# Patient Record
Sex: FEMALE | Race: WHITE | Employment: FULL TIME | URBAN - METROPOLITAN AREA
[De-identification: names, ages, dates, MRNs, and addresses within clinical notes are randomized per-mention and may not be internally consistent; named-entity substitution may affect disease eponyms.]

---

## 2019-01-01 ENCOUNTER — HOSPITAL ENCOUNTER (EMERGENCY)
Age: 59
End: 2019-01-07
Attending: EMERGENCY MEDICINE | Admitting: INTERNAL MEDICINE
Payer: SELF-PAY

## 2019-01-01 VITALS
TEMPERATURE: 96.1 F | WEIGHT: 184.53 LBS | DIASTOLIC BLOOD PRESSURE: 53 MMHG | BODY MASS INDEX: 30.71 KG/M2 | SYSTOLIC BLOOD PRESSURE: 100 MMHG | HEART RATE: 98 BPM | OXYGEN SATURATION: 100 % | RESPIRATION RATE: 15 BRPM

## 2019-01-01 DIAGNOSIS — I46.9 CARDIAC ARREST (HCC): Primary | ICD-10-CM

## 2019-01-01 DIAGNOSIS — I21.09 ST ELEVATION MYOCARDIAL INFARCTION (STEMI) OF ANTERIOR WALL (HCC): ICD-10-CM

## 2019-01-01 LAB
ARTERIAL PATENCY WRIST A: YES
BASE DEFICIT BLDA-SCNC: 21.6 MMOL/L
BDY SITE: ABNORMAL
EPAP/CPAP/PEEP, PAPEEP: 6
FIO2 ON VENT: 100 %
GAS FLOW.O2 SETTING OXYMISER: 14 L/MIN
HCO3 BLDA-SCNC: 11 MMOL/L (ref 22–26)
PCO2 BLDA: 52 MMHG (ref 35–45)
PH BLDA: 6.94 [PH] (ref 7.35–7.45)
PO2 BLDA: 171 MMHG (ref 80–100)
SAO2 % BLD: 98 % (ref 92–97)
SAO2% DEVICE SAO2% SENSOR NAME: ABNORMAL
SERVICE CMNT-IMP: ABNORMAL
SPECIMEN SITE: ABNORMAL
VENTILATION MODE VENT: ABNORMAL
VT SETTING VENT: 450 ML

## 2019-01-01 PROCEDURE — 93005 ELECTROCARDIOGRAM TRACING: CPT

## 2019-01-01 PROCEDURE — 96376 TX/PRO/DX INJ SAME DRUG ADON: CPT

## 2019-01-01 PROCEDURE — 96368 THER/DIAG CONCURRENT INF: CPT

## 2019-01-01 PROCEDURE — 77030008683 HC TU ET CUF COVD -A

## 2019-01-01 PROCEDURE — 77030021678 HC GLIDESCP STAT DISP VERT -B

## 2019-01-01 PROCEDURE — 36600 WITHDRAWAL OF ARTERIAL BLOOD: CPT

## 2019-01-01 PROCEDURE — 94002 VENT MGMT INPAT INIT DAY: CPT

## 2019-01-01 PROCEDURE — 92950 HEART/LUNG RESUSCITATION CPR: CPT

## 2019-01-01 PROCEDURE — 74011250636 HC RX REV CODE- 250/636

## 2019-01-01 PROCEDURE — 77030013079 HC BLNKT BAIR HGGR 3M -A

## 2019-01-01 PROCEDURE — 74011000250 HC RX REV CODE- 250

## 2019-01-01 PROCEDURE — 96365 THER/PROPH/DIAG IV INF INIT: CPT

## 2019-01-01 PROCEDURE — 74011250636 HC RX REV CODE- 250/636: Performed by: INTERNAL MEDICINE

## 2019-01-01 PROCEDURE — 82803 BLOOD GASES ANY COMBINATION: CPT

## 2019-01-01 PROCEDURE — 74011250636 HC RX REV CODE- 250/636: Performed by: EMERGENCY MEDICINE

## 2019-01-01 PROCEDURE — 99285 EMERGENCY DEPT VISIT HI MDM: CPT

## 2019-01-01 PROCEDURE — 74011000250 HC RX REV CODE- 250: Performed by: EMERGENCY MEDICINE

## 2019-01-01 RX ORDER — FENTANYL CITRATE 50 UG/ML
INJECTION, SOLUTION INTRAMUSCULAR; INTRAVENOUS
Status: DISCONTINUED
Start: 2019-01-01 | End: 2019-01-01 | Stop reason: WASHOUT

## 2019-01-01 RX ORDER — FENTANYL CITRATE 50 UG/ML
25-50 INJECTION, SOLUTION INTRAMUSCULAR; INTRAVENOUS
Status: CANCELLED | OUTPATIENT
Start: 2019-01-01

## 2019-01-01 RX ORDER — BIVALIRUDIN 250 MG/5ML
INJECTION, POWDER, LYOPHILIZED, FOR SOLUTION INTRAVENOUS
Status: DISCONTINUED
Start: 2019-01-01 | End: 2019-01-01 | Stop reason: WASHOUT

## 2019-01-01 RX ORDER — NOREPINEPHRINE BITARTRATE/D5W 4MG/250ML
PLASTIC BAG, INJECTION (ML) INTRAVENOUS
Status: COMPLETED | OUTPATIENT
Start: 2019-01-01 | End: 2019-01-01

## 2019-01-01 RX ORDER — LIDOCAINE HYDROCHLORIDE 10 MG/ML
1-30 INJECTION, SOLUTION EPIDURAL; INFILTRATION; INTRACAUDAL; PERINEURAL
Status: CANCELLED | OUTPATIENT
Start: 2019-01-01

## 2019-01-01 RX ORDER — HEPARIN SODIUM 200 [USP'U]/100ML
500 INJECTION, SOLUTION INTRAVENOUS ONCE
Status: CANCELLED | OUTPATIENT
Start: 2019-01-01 | End: 2019-01-01

## 2019-01-01 RX ORDER — AMIODARONE HYDROCHLORIDE 150 MG/3ML
INJECTION, SOLUTION INTRAVENOUS
Status: COMPLETED | OUTPATIENT
Start: 2019-01-01 | End: 2019-01-01

## 2019-01-01 RX ORDER — SODIUM BICARBONATE 1 MEQ/ML
SYRINGE (ML) INTRAVENOUS
Status: COMPLETED | OUTPATIENT
Start: 2019-01-01 | End: 2019-01-01

## 2019-01-01 RX ORDER — SODIUM CHLORIDE 900 MG/100ML
INJECTION INTRAVENOUS
Status: DISCONTINUED
Start: 2019-01-01 | End: 2019-01-01 | Stop reason: WASHOUT

## 2019-01-01 RX ORDER — MIDAZOLAM HYDROCHLORIDE 1 MG/ML
INJECTION, SOLUTION INTRAMUSCULAR; INTRAVENOUS
Status: DISCONTINUED
Start: 2019-01-01 | End: 2019-01-01 | Stop reason: WASHOUT

## 2019-01-01 RX ORDER — EPINEPHRINE 0.1 MG/ML
INJECTION INTRACARDIAC; INTRAVENOUS
Status: COMPLETED | OUTPATIENT
Start: 2019-01-01 | End: 2019-01-01

## 2019-01-01 RX ORDER — HEPARIN SODIUM 1000 [USP'U]/ML
INJECTION, SOLUTION INTRAVENOUS; SUBCUTANEOUS
Status: DISCONTINUED
Start: 2019-01-01 | End: 2019-01-01 | Stop reason: WASHOUT

## 2019-01-01 RX ORDER — HEPARIN SODIUM 5000 [USP'U]/ML
4000 INJECTION, SOLUTION INTRAVENOUS; SUBCUTANEOUS
Status: COMPLETED | OUTPATIENT
Start: 2019-01-01 | End: 2019-01-01

## 2019-01-01 RX ORDER — HEPARIN SODIUM 10000 [USP'U]/100ML
10-25 INJECTION, SOLUTION INTRAVENOUS
Status: DISCONTINUED | OUTPATIENT
Start: 2019-01-01 | End: 2019-01-01 | Stop reason: HOSPADM

## 2019-01-01 RX ORDER — LIDOCAINE HYDROCHLORIDE 10 MG/ML
INJECTION, SOLUTION EPIDURAL; INFILTRATION; INTRACAUDAL; PERINEURAL
Status: DISCONTINUED
Start: 2019-01-01 | End: 2019-01-01 | Stop reason: HOSPADM

## 2019-01-01 RX ORDER — HEPARIN SODIUM 200 [USP'U]/100ML
INJECTION, SOLUTION INTRAVENOUS
Status: DISCONTINUED
Start: 2019-01-01 | End: 2019-01-01 | Stop reason: HOSPADM

## 2019-01-01 RX ADMIN — EPINEPHRINE 1 MG: 0.1 INJECTION, SOLUTION ENDOTRACHEAL; INTRACARDIAC; INTRAVENOUS at 13:10

## 2019-01-01 RX ADMIN — EPINEPHRINE 1 MG: 0.1 INJECTION, SOLUTION ENDOTRACHEAL; INTRACARDIAC; INTRAVENOUS at 12:58

## 2019-01-01 RX ADMIN — Medication 20 MCG/MIN: at 13:25

## 2019-01-01 RX ADMIN — EPINEPHRINE 1 MG: 0.1 INJECTION, SOLUTION ENDOTRACHEAL; INTRACARDIAC; INTRAVENOUS at 13:16

## 2019-01-01 RX ADMIN — HEPARIN SODIUM AND DEXTROSE 10 UNITS/KG/HR: 10000; 5 INJECTION INTRAVENOUS at 14:16

## 2019-01-01 RX ADMIN — SODIUM CHLORIDE 1000 ML: 900 INJECTION, SOLUTION INTRAVENOUS at 13:10

## 2019-01-01 RX ADMIN — EPINEPHRINE 1 MG: 0.1 INJECTION, SOLUTION ENDOTRACHEAL; INTRACARDIAC; INTRAVENOUS at 14:43

## 2019-01-01 RX ADMIN — HEPARIN SODIUM 4000 UNITS: 5000 INJECTION INTRAVENOUS; SUBCUTANEOUS at 13:04

## 2019-01-01 RX ADMIN — AMIODARONE HYDROCHLORIDE 150 MG: 50 INJECTION, SOLUTION INTRAVENOUS at 13:03

## 2019-01-01 RX ADMIN — SODIUM BICARBONATE 50 MEQ: 84 INJECTION, SOLUTION INTRAVENOUS at 13:15

## 2019-01-07 PROBLEM — I46.9 CARDIAC ARREST (HCC): Status: ACTIVE | Noted: 2019-01-01

## 2019-01-07 PROBLEM — I21.3 STEMI (ST ELEVATION MYOCARDIAL INFARCTION) (HCC): Status: ACTIVE | Noted: 2019-01-01

## 2019-01-07 NOTE — PROGRESS NOTES
Pharmacy  Heparin Monitoring Indication: AMI Goal aPTT: 58-77 seconds Initial dosing Weight: 83.7 kg Labs: 
No results for input(s): APTT, HGB, PLT, INR, HGBEXT, PLTEXT in the last 72 hours. No lab exists for component: INREXT Current rate:  10 unit/kg/hr Impression/Plan:  
? Obtain baseline CBC, aPTT before starting heparin ? Start at 10 unit/kg/hour and please don't give a bolus based on the aPTT 6 hours from the start of the heparin drip, since the patient received tPA for MI. 
? Can give a bolus of heparin 12 hours from the start of the heparin drip using the protocol. ? PRN bolus dose (Yes  Include Dose or No): Yes, already received 4,000 unitsX1 ? Infusion rate, PRN bolus dose and aPTT results were discussed with the nurse: (Yes/No): Not with the nurse, but with the ED pharmacist  
 
Thanks, ESEQUIEL LoaizaD 
 
[email or web address]/[email or web address]. pdf

## 2019-01-07 NOTE — ED PROVIDER NOTES
EMERGENCY DEPARTMENT HISTORY AND PHYSICAL EXAM 
 
 
Date: 1/7/2019 Patient Name: Braulio Albrecht History of Presenting Illness No chief complaint on file. History Provided By: EMS 
 
HPI: Braulio Albrecht, 59 y.o. female with PMHx significant for HTN, hypercholesterolemia, presents via EMS intubated with CPR in progress to the ED for further evaluation of being found unresponsive ~1210 this afternoon. EMS reports pt on 1/5 pt called EMS for chest pain / SOB likely to be having a panic attack. Upon EMS arrival a 12 lead was taken and shown to be normal and she was not transported to the hospital. EMS discloses pt woke up around 0200 this morning with similar sx and called EMS. Another 12 lead was taken showing NSR and pt was not transported to the ED. Pt obtained appointment with PCP this morning and her sx were attributed to panic attacks and pt was referred to a cardiologist. Pt left PCP's office around 11:30 AM and was found by a bystander unresponsive, apneic, and in asystole, noting bystander CPR was started as EMS was en route. EMS arrived on the scene at 29  and pt was intubated and has been in PEA en route to the ED. Pt has received 5 rounds of epinephrine en route. History is limited due to pt being unresponsive. There are no other complaints, changes, or physical findings at this time. PCP: No primary care provider on file. No current facility-administered medications on file prior to encounter. Current Outpatient Medications on File Prior to Encounter Medication Sig Dispense Refill  
 HYDROcodone-acetaminophen (NORCO)  mg tablet Take 1 Tab by mouth every six (6) hours as needed for Pain. Max Daily Amount: 4 Tabs. 25 Tab 0  
 venlafaxine (EFFEXOR) 25 mg tablet Take 25 mg by mouth two (2) times a day.  lisinopril (PRINIVIL, ZESTRIL) 10 mg tablet Take  by mouth daily.  pravastatin (PRAVACHOL) 10 mg tablet Take  by mouth nightly. Past History Past Medical History: 
Past Medical History:  
Diagnosis Date  Aortic insufficiency  Hypercholesterolemia  Hypertension  Murmur, cardiac Past Surgical History: 
Past Surgical History:  
Procedure Laterality Date  HX GYN    
 tubal ligation Family History: 
Family History Problem Relation Age of Onset  Heart Disease Mother  Heart Disease Father  Heart Disease Brother  Anesth Problems Neg Hx Social History: 
Social History Tobacco Use  Smoking status: Former Smoker  Smokeless tobacco: Never Used Substance Use Topics  Alcohol use: Yes Comment: social  
 Drug use: No  
 
 
Allergies: 
No Known Allergies Review of Systems Review of Systems Unable to perform ROS: Patient unresponsive Physical Exam  
Physical Exam  
Constitutional: She is intubated. Perez airway No obvious signs of trauma HENT:  
Head: Normocephalic and atraumatic. Eyes: Conjunctivae are normal.  
Pupils are significantly dilated and fixed Neck: Normal range of motion. Neck supple. Cardiovascular:  
Pulses: 
     Carotid pulses are 0 on the right side, and 0 on the left side. Radial pulses are 0 on the right side, and 0 on the left side. Femoral pulses are 0 on the right side, and 0 on the left side. Pulmonary/Chest: She is intubated. Bagging without difficulty Abdominal: Soft. She exhibits no distension. There is no tenderness. Musculoskeletal: She exhibits no edema or deformity. Neurological: She is unresponsive. Skin: There is pallor. Nursing note and vitals reviewed. Diagnostic Study Results Labs - Recent Results (from the past 12 hour(s)) EKG, 12 LEAD, INITIAL Collection Time: 01/07/19  1:26 PM  
Result Value Ref Range Ventricular Rate 67 BPM  
 Atrial Rate 56 BPM  
 QRS Duration 142 ms  
 Q-T Interval 470 ms QTC Calculation (Bezet) 496 ms Calculated R Axis 21 degrees Calculated T Axis 59 degrees Diagnosis Wide QRS rhythm Right bundle branch block When compared with ECG of 27-MAR-2015 07:01, Wide QRS rhythm has replaced Sinus rhythm BLOOD GAS, ARTERIAL Collection Time: 01/07/19  1:45 PM  
Result Value Ref Range  
 pH 6.94 (LL) 7.35 - 7.45    
 PCO2 52 (H) 35.0 - 45.0 mmHg PO2 171 (H) 80 - 100 mmHg O2 SAT 98 (H) 92 - 97 % BICARBONATE 11 (L) 22 - 26 mmol/L  
 BASE DEFICIT 21.6 mmol/L  
 O2 METHOD VENTILATOR    
 FIO2 100 % MODE A/C Tidal volume 450 SET RATE 14    
 EPAP/CPAP/PEEP 6.0 Sample source ARTERIAL    
 SITE LEFT RADIAL OLGA'S TEST YES Critical value read back Viv Erick MCGHEE   
 
 
Radiologic Studies - No orders to display CT Results  (Last 48 hours) None CXR Results  (Last 48 hours) None Medical Decision Making I am the first provider for this patient. I reviewed the vital signs, available nursing notes, past medical history, past surgical history, family history and social history. Vital Signs-Reviewed the patient's vital signs. Patient Vitals for the past 12 hrs: 
 Temp Pulse Resp BP SpO2  
01/07/19 1420  98  100/53   
01/07/19 1405  96  (!) 53/30   
01/07/19 1401  (!) 108  (!) 140/99 100 % 01/07/19 1356  83 15    
01/07/19 1350  75 20 (!) 142/94   
01/07/19 1343 96.1 °F (35.6 °C) 82 15 (!) 143/92   
01/07/19 1332  71 10 102/80   
01/07/19 1327  68  (!) 84/69   
01/07/19 1325  73  (!) 84/72   
01/07/19 1321    (!) 95/36   
01/07/19 1320  61 17 103/42   
01/07/19 1312  (!) 116 (!) 44 103/42   
01/07/19 1306  87 (!) 46 (!) 79/66   
01/07/19 1300    (!) 141/96   
01/07/19 1258  87 19   EKG interpretation: (Preliminary) 1259 Rhythm: sinus tachycardia with 1st degree AV block; and regular . Rate (approx.): 105;  Axis: normal; ID interval: normal; QRS interval: normal ; ST/T wave: elevation V1-V4 with reciprocated ST depression; Other findings: STEMI. EKG interpretation: (Repeat) 1302 Rhythm: Wide complex rhythm with POLINA; and regular . Rate (approx.): 91; Axis: left axis deviation; FL interval: normal; QRS interval: normal ; ST/T wave: elevated ; Other findings: possible ischemia. Records Reviewed: Nursing Notes, Old Medical Records, Previous electrocardiograms, Ambulance Run Sheet, Previous Radiology Studies and Previous Laboratory Studies Provider Notes (Medical Decision Making):  
Patient presents pulseless in cardiac arrest.  CPR immediately started and IV attempted for medication administration, fluids and labs. DDx: ACS, cardiogenic shock, cardiac Tamponade, Septic shock, tension PTX, PE, Hyperkalemia, hypokalemia, hypoglycemia, acidosis, hypovolemia, hypothermia, hypoxic respiratory failure, drug/toxin toxicity. ED Course:  
Initial assessment performed. The patients presenting problems have been discussed, and they are in agreement with the care plan formulated and outlined with them. I have encouraged them to ask questions as they arise throughout their visit. ED Course as of Jan 07 1635 Ehsanjorge Yuli Jan 07, 2019  
1605 Called pt son at [phone] and number is stating busy. Will attempt again. Eric Zamora M.D. 
  [JS] ED Course User Index [JS] Jeremiah Macedo MD  
  
Progress Notes: 
 
Pt found in asystole and apneic around 1210 when bystander CPR was started. CPR in progress at time of arrival to unit. Code Blue:  
1919- arrival to ED / unit 1256- Bicarb (1) / D50 pushed 1257 - pulse check, carotid pulse found, bedside US shows cardiac activity, CPR held. 1258- Epinephrine (6)  
1259 - EKG obtained showing STEMI, cardiology paged 1305 - pulse lost, CPR resumed, Eric Zamora M.D has spoken with Dr. Destin Howe who will evaluate the pt in the ED 
1309 - pulse check, no pulse found however pt in junctional bradycardic rhythm; bedside ultrasound still shows cardiac activity; CPR resumed 1310 - epinephrine (7)  
1312- cardiology at bedside 1313- pulse check, no pulse found 1314 - CPR resumed 1315 - Bicarb (2) / Epinephrine (8) 
1316 - pulse check, CPR resumed 1318 - pulse check, pulse found; bedside ultrasound shows cardiac activity; CPR held 1320 - 100mg TPA pushed Code Blue 1434 - PEA seen on monitor, CPR started 1434 - Epinephrine (1) 
1437 - Epinephrine (2) 
1438 - Pulse check, no pulse found 1439 - CPR resumed 1440 - Bicarb (1) 
1441 Epinephrine (3) 
1441 - Pulse check, no pulse found; CPR resumed 1443 - pulse check, no pulse found; Bedside ultrasound shows no cardiac activity 1443 - TOD called [address] [place] [address] 
1958 Time EMS pre-alert: N/A Time STEMI called: 7001 Time arrived on Unit: 1252, Marline Fothergill, MD in room Time EKG completed: 1259 Time EKG read by provider: [phone] Time cardiology/cath lab paged: [phone] Time cardiologist returned call: [phone] Time Cath Lab returned call:  N/A Time Cardiologist arrived on Unit: [id number] Time Cath Lab arrived on Unit: 4707 Procedure Note - Bedside Ultrasound: 
12:57 PM 
Performed by: Madiha Aranda M.D 
US of heart performed at beside, showing good cardiac activity. The procedure took 1-15 minutes, and pt tolerated well. Procedure Note - Bedside Ultrasound: 
1:09 PM 
Performed by: Madiha Aranda M.D 
US of heart performed at beside, showing cardiac activity, junctional bradycardic rhythm seen on monitor. The procedure took 1-15 minutes, and pt tolerated well. Procedure Note - Bedside Ultrasound: 
1:18 PM 
Performed by: Madiha Aranda M.D 
US of heart performed at beside, showing cardiac activity. The procedure took 1-15 minutes, and pt tolerated well. Procedure Note - Bedside Ultrasound: 
2:43 PM 
Performed by: Madiha Aranda M.D 
US of heart performed at beside, showing no cardiac activity. The procedure took 1-15 minutes, and pt tolerated well. 
 
3:07 PM  
Marzena Anderson M.D has attempted to call pt's family. No voicemail box set up.  
 
3:41 PM  
Marzena Anderson M.D has attempted to call pt's  Dior Peralta again, unable to leave voicemail. 4:36 PM 
Called  again and goes to . The phone number given to me for pt son is no longer in service. PD went to the address on file and apparently pt and  do not live there anymore. There was a  there who said they moved out months ago. In pt chirage, found drivers license with another adddress [address] Giancarlo Vasquez so PD going there to find . Marzena Anderson M.D. Critical Care Time: CRITICAL CARE NOTE : 
2:20 PM 
IMPENDING DETERIORATION -Airway, Respiratory, Cardiovascular, CNS and Metabolic ASSOCIATED RISK FACTORS - Hypotension, Shock, Hypoxia, Bleeding and Dysrhythmia MANAGEMENT- Bedside Assessment and Supervision of Care INTERPRETATION -  Blood Gases, ECG, Blood Pressure, Cardiac Output Measures  and Screening Ultrasound INTERVENTIONS - hemodynamic mngmt and vent mngmt CASE REVIEW - Hospitalist, Medical Sub-Specialist and Nursing TREATMENT RESPONSE -Unchanged PERFORMED BY - Self NOTES   : 
I have spent 100 minutes of critical care time involved in lab review, consultations with specialist, family decision- making, bedside attention and documentation. During this entire length of time I was immediately available to the patient . Disposition: 
TOD 1443. PLAN: 
1. TOD 1443 Diagnosis Clinical Impression: 1. Cardiac arrest ([facility] [address] 2. ST elevation myocardial infarction (STEMI) of anterior wall ([facility] [address] Attestations: 
 
Attestation: This note is prepared by Vishal Thacker, acting as Scribe for Marzena Anderson M.D. Marzena Anderson M.D: The scribe's documentation has been prepared under my direction and personally reviewed by me in its entirety.  I confirm that the note above accurately reflects all work, treatment, procedures, and medical decision making performed by me.

## 2019-01-07 NOTE — ED NOTES
Pt's clothing, purse, and silver bracelet sent with pt to [employer] wallet with 2 credit cards, checkbook, and $2 cash given to security.

## 2019-01-07 NOTE — ED NOTES
Unable to get in contact with Pt's , discussed with charge and [facility] RN director. Received return call from pt's pcp offica and was given the pt's sons' phone number and the number was given to Dr. Jai Altman

## 2019-01-07 NOTE — PROGRESS NOTES
Spiritual Care Assessment/Progress Note [address] 
 
 
NAME: Chela Solis      MRN: [id number] AGE: 59 y.o. SEX: female Yarsani Affiliation: No Episcopal Language: [place] 1/7/2019     Total Time (in minutes): 20 Spiritual Assessment begun in [facility] EMERGENCY DEPT through conversation with: 
  
    []Patient        [] Family    [] Friend(s) Reason for Consult: Crisis, Stemi, Emergency Department visit Spiritual beliefs: (Please include comment if needed) 
   [] Identifies with a amarilis tradition:     
   [] Supported by a amarilis community:        
   [] Claims no spiritual orientation:       
   [] Seeking spiritual identity:            
   [] Adheres to an individual form of spirituality:       
   [x] Not able to assess:                   
 
    
Identified resources for coping:  
   [] Prayer                           
   [] Music                  [] Guided Imagery 
   [] Family/friends                 [] Pet visits [] Devotional reading                         [x] Unknown 
   [] Other:                                       
 
 
Interventions offered during this visit: (See comments for more details) Patient Interventions: Crisis Plan of Care: 
 
 [x] Support spiritual and/or cultural needs  
 [] Support AMD and/or advance care planning process    
 [] Support grieving process 
 [] Coordinate Rites and/or Rituals  
 [] Coordination with community clergy [] No spiritual needs identified at this time 
 [] Detailed Plan of Care below (See Comments)  [] Make referral to Music Therapy 
[] Make referral to Pet Therapy    
[] Make referral to Addiction services 
[] Make referral to [facility] 
[] Make referral to Spiritual Care Partner 
[] No future visits requested       
[] Follow up visits as needed Comments:  Responded to page to ER. Patient arrived in cardiac arrest which was later determined to be a STEMI. No family present.   According to the EMS squad member I spoke with, patient was found on side of road and brought in. Patient was taken to Cath Lab. Chaplains will follow as able. Chance Bender, MPS, [address], [facility]  Paging Service  [phone])

## 2019-01-07 NOTE — ED NOTES
Per Dr. Bari So, she is unable to get in touch with pt's  at number listed on chart. Pastoral care paged at this time

## 2019-01-07 NOTE — ED NOTES
Received call from  and he states the pt no longer lives at that address, gave the  address that is listed on her drivers license

## 2019-01-07 NOTE — ED NOTES
[address] started 1438 compressions held 1439 compressions resumed 1440 compressions held 1441 compressions resumed 1442 compressions held

## 2019-01-07 NOTE — PROGRESS NOTES
Responded to page from [address] staff to support family of patient who had  earlier in the afternoon. Met with daughter Luis Eduardo Edwards and her best friend Radha Hall in the ER Consult room to offer support. Michelle Elena was appropriately grieving and did not want to go to the room to view the body. She gave verbal consent for her friend Yanni Pack to receive and sign for the belongings. Consulted with RN and collaborated with Security to hand over the belongings to Yanni Pack, which was done in [facility] office. Provided Yanni Pack with contact info for nursing supervisor to call with  home information. Offered support for the staff. Rev. Colleen Hamilton Paging Service: [phone])

## 2019-01-07 NOTE — ED NOTES
1149 pt was reported to have left her pcp office, at 1210 pt was found down unresponsive by a bystander and cpr was started at 1210. EMS arrived to her at [phone], had 5 rounds of epi in route. Was given D50 pt arrived here at [phone] and cpr in progress

## 2019-01-07 NOTE — CONSULTS
[address]  [phone] [address] [facility] Date of  Admission: 1/7/2019 12:54 PM  
 
Admission type:Emergency Consult for: Cardiac Arrest  
Consult by: Brooke Monzon Subjective:  
 
Kat Bean is a 59 y.o. female admitted for No admission diagnoses are documented for this encounter. .Patient complains of  cardiac arrest. Previous treatment/evaluation includes none . Cardiac risk factors: hypertension, post-menopausal. 
 
Patient presented to ED in cardiac arrest via EMS. Patient called EMS to residence several times in the last few days. Reported to PCP office this AM for c/o SOB. PCP referred patient to follow up with cardiology. Patient left PCP office at [phone], found down in car on side of the road by bystander who started CPR and called EMS at 1210. EMS arrived on scene at [phone]. PEA on EMS arrival, started CPR en route to hospital. Upon arrival to ED, patient had pulse briefly, EKG obtained at this time. EKG showed ST elevation, Dr. Brie Dyer called Dr. Elder Jason directly. Patient again PEA and ACLS measures started. Dr. Brie Dyer ordered dose of alteplase as rescue medication as the patient had been CPR for a prolonged period of time and she felt it was extremely unlikely patient stabilize long enough to get to the Cath Lab. Dr. Elder Jason prepared to take patient to CCL, patient. Pulse regained while prepping for cath lab, Dr. Elder Jason made aware of tPA administration, repeat EKG no longer showed ST elevation. Patient intubated in ED at time of exam, for admission to ICU. No family present in ED. Information gathered from EMR. Patient Active Problem List  
 Diagnosis Date Noted  Cardiac arrest ([facility] 01/07/2019  
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  
Diagnosis Date  Aortic insufficiency  Cardiac arrest [facility] 1/7/2019  Hypercholesterolemia  Hypertension  Murmur, cardiac  STEMI (ST elevation myocardial infarction) ([facility] 1/7/2019 Social History Socioeconomic History  Marital status:  Spouse name: Not on file  Number of children: Not on file  Years of education: Not on file  Highest education level: Not on file Tobacco Use  Smoking status: Former Smoker  Smokeless tobacco: Never Used Substance and Sexual Activity  Alcohol use: Yes Comment: social  
 Drug use: No  
 Sexual activity: Not Currently No Known Allergies Family History Problem Relation Age of Onset  Heart Disease Mother  Heart Disease Father  Heart Disease Brother  Anesth Problems Neg Hx Current Facility-Administered Medications Medication Dose Route Frequency  lidocaine (PF) (XYLOCAINE) 10 mg/mL (1 %) injection  heparinized saline 2 units/mL 1,000 unit/500 mL infusion  heparin 25,000 units in D5W 250 ml infusion  10-25 Units/kg/hr IntraVENous TITRATE Current Outpatient Medications Medication Sig  
 HYDROcodone-acetaminophen (NORCO)  mg tablet Take 1 Tab by mouth every six (6) hours as needed for Pain. Max Daily Amount: 4 Tabs.  venlafaxine (EFFEXOR) 25 mg tablet Take 25 mg by mouth two (2) times a day.  lisinopril (PRINIVIL, ZESTRIL) 10 mg tablet Take  by mouth daily.  pravastatin (PRAVACHOL) 10 mg tablet Take  by mouth nightly. Review of Symptoms:  
11 systems reviewed, patient intubated post cardiac arrest. Information gathered from medical record. Objective:  
  
Visit Vitals /53 Pulse 98 Temp 96.1 °F (35.6 °C) Resp 15 Wt 184 lb 8.4 oz (83.7 kg) SpO2 100% BMI 30.71 kg/m² Physical:  
General: patient ventilated post cardiac arrest  
Heart:  Reg rhythm Lungs: ventilated Abdomen: Soft, +BS, NTND Extremities:  no edema Neurologic: Grossly normal 
 
Data Review: No results for input(s): WBC, HGB, HCT, PLT, HGBEXT, HCTEXT, PLTEXT, HGBEXT, HCTEXT, PLTEXT in the last 72 hours. No results for input(s): NA, K, CL, CO2, GLU, BUN, CREA, CA, MG, PHOS, ALB, TBIL, TBILI, SGOT, ALT, INR in the last 72 hours. No lab exists for component:  BNP, INREXT, INREXT No results for input(s): TROIQ, CPK, CKMB in the last 72 hours. No intake or output data in the 24 hours ending 19 1636 Cardiographics Telemetry: SR with RBB  
ECG: Wide QRS with RBB Echocardiogram:none on file CXRAY: none on file Assessment:  
  
 Principal Problem: STEMI (ST elevation myocardial infarction) ([facility] (2019) Active Problems: 
  Cardiac arrest ([facility] (2019) Plan: S/p PEA/STEMI/Cardiac Arrest withCardiogenic Shock Presented to ED in with PEA, found by bystander in car who started CPR 
PEA on arrival to ED with brief episode pulse - EKG obtained at this time showed STEMI 
ED Physician Dr. Ginna Andrade placed call directly to Dr. Brandyn Salinas who was prepping to take to [address] [place] [address] Dr. Ginna Andrade administered rescue dose of tPA - patient regained pulse and repeat EKG no longer showed ST elevation Patient is currently intubated, on levophed drip For admission to hospitalist to ICU Will follow and work up etiology of cardiac arrest as patient status allows ECHO ordered Continue levophed drip Continue to work on locating [employer] and [address] Last encounter with [address] shows a cancelled endoscopy from 2018 - otherwise no encounter since  with gynecology Kellee Ambriz [facility] Student Agree with NP student assessment and plan. Discussions began reference starting hypothermia protocol, but patient has since . Thank you for consulting BERNADINE Wall MD  
 
Patient seen and examined by me with nurse practitioner Angela Gould and NP student Kellee Ambriz.   I personally performed all components of the history, physical, and medical decision making and agree with the assessment and plan with minor modifications as noted. Ms. Basia Parada initially presented in PEA arrest after several rounds of epinephrine had ROSC (return of spontaneous circulation). EKG done at that time showed ST elevation myocardial infarction at which point I was contacted by Dr. Moo Edwards. The patient quickly decompensated thereafter and returned to PEA for which CPR was restarted. I started making preparations to take the patient to the catheterization lab if and when spontaneous circulation returned. Dr. Moo Edwards had ordered and administered a dose of alteplase as rescue medication as the patient had been CPR for a prolonged period of time and she felt it was extremely unlikely patient stabilize long enough to get to the Cath Lab. After that, the patient had return of spontaneous circulation again was started on levophed. EKG at that time showed resolution of ST elevation. Quick bedside echo with the ER ultrasound machine done by Dr. Moo Edwards and reviewed by me showed fairly normal LV systolic function with some anterior hypokinesis. Plan at that time was to treat with Plavix 300 mg, IV heparin as per AHA guidelines, continue supportive care with a plan for cardiac catheterization and PCI if indicated in the next 24-48 hours once the patient stabilized and neurologic prognosis was verified to be good. I gave my cell phone number to her nurse Ayla Aguila to notify me if there were any changes in her status. The patient's /family was not available at any point during my interactions with the patient. I advised the nurse to call when family arrived.   Then I began discussions with pharmacy about antithrombotic regimen, discussion of her case with medicine Dr. Guo/preparations to transfer to ICU and investigation about duration of pulselessness to see if she was a candidate for therapeutic induced hypothermia. I received a page at 2:44 PM spoke with Dr. Mor Frazier who explained that the patient had unfortunately decompensated again and  in the interval.   
 
Thanks for the consult. I appreciate the opportunity to participate in this patient's care. Greater than 30 minutes of critical care time spent at the bedside exclusive of procedures.

## 2019-01-07 NOTE — PROGRESS NOTES
Responded to page to ER when patient . No family present. As far as could be determined, patient's  has not been notified. Chaplains available for support when/if any family arrives. CHELSI Fajardo, [facility], [facility]  Paging Service  [phone])

## 2019-01-08 LAB
ATRIAL RATE: 108 BPM
ATRIAL RATE: 56 BPM
CALCULATED P AXIS, ECG09: 66 DEGREES
CALCULATED R AXIS, ECG10: 18 DEGREES
CALCULATED R AXIS, ECG10: 21 DEGREES
CALCULATED T AXIS, ECG11: -176 DEGREES
CALCULATED T AXIS, ECG11: 59 DEGREES
DIAGNOSIS, 93000: NORMAL
DIAGNOSIS, 93000: NORMAL
P-R INTERVAL, ECG05: 280 MS
Q-T INTERVAL, ECG07: 310 MS
Q-T INTERVAL, ECG07: 470 MS
QRS DURATION, ECG06: 142 MS
QRS DURATION, ECG06: 88 MS
QTC CALCULATION (BEZET), ECG08: 409 MS
QTC CALCULATION (BEZET), ECG08: 496 MS
VENTRICULAR RATE, ECG03: 105 BPM
VENTRICULAR RATE, ECG03: 67 BPM